# Patient Record
Sex: FEMALE | Race: WHITE | URBAN - METROPOLITAN AREA
[De-identification: names, ages, dates, MRNs, and addresses within clinical notes are randomized per-mention and may not be internally consistent; named-entity substitution may affect disease eponyms.]

---

## 2021-11-15 ENCOUNTER — WEB ENCOUNTER (OUTPATIENT)
Dept: URBAN - METROPOLITAN AREA CLINIC 40 | Facility: CLINIC | Age: 71
End: 2021-11-15

## 2021-11-15 ENCOUNTER — OFFICE VISIT (OUTPATIENT)
Dept: URBAN - METROPOLITAN AREA CLINIC 40 | Facility: CLINIC | Age: 71
End: 2021-11-15
Payer: MEDICARE

## 2021-11-15 VITALS
HEIGHT: 67 IN | DIASTOLIC BLOOD PRESSURE: 74 MMHG | BODY MASS INDEX: 23.7 KG/M2 | TEMPERATURE: 97.7 F | HEART RATE: 67 BPM | WEIGHT: 151 LBS | SYSTOLIC BLOOD PRESSURE: 122 MMHG

## 2021-11-15 DIAGNOSIS — Z80.0 FAMILY HISTORY OF COLON CANCER: ICD-10-CM

## 2021-11-15 DIAGNOSIS — K92.1 BLOOD IN STOOL: ICD-10-CM

## 2021-11-15 DIAGNOSIS — Z86.010 PERSONAL HISTORY OF COLON POLYPS: ICD-10-CM

## 2021-11-15 PROCEDURE — 99203 OFFICE O/P NEW LOW 30 MIN: CPT | Performed by: PHYSICIAN ASSISTANT

## 2021-11-15 NOTE — PHYSICAL EXAM CONSTITUTIONAL:
well developed, well nourished , in no acute distress , ambulating with walker , normal communication ability

## 2021-11-15 NOTE — HPI-TODAY'S VISIT:
Ms. Cabrera is a 70-year-old female who presents to the office today to schedule screening colonoscopy.  She actually had a colonoscopy with Dr. Klein back on June 23, 2011 where she was noted to have two small, sessile polyps of 2 to 3 mm in size removed from the cecum and ascending colon.  These were consistent with tubular adenomas.  She has a history of HTN, HLD, Hypothyroidism, rheumatoid arthritis, spinal stenosis, chronic kidney disease and anemia. Presents to the office today with no significant GI complaints.  Does have rare wipe bleeding of generally painless BRBPR if constipated. Attributes this to hemorrhoid. Has some GERD symptoms for which she will take medication famotidine. Good appetite and weight stable.

## 2021-12-27 ENCOUNTER — CLAIMS CREATED FROM THE CLAIM WINDOW (OUTPATIENT)
Dept: URBAN - METROPOLITAN AREA CLINIC 4 | Facility: CLINIC | Age: 71
End: 2021-12-27
Payer: MEDICARE

## 2021-12-27 ENCOUNTER — OFFICE VISIT (OUTPATIENT)
Dept: URBAN - METROPOLITAN AREA SURGERY CENTER 30 | Facility: SURGERY CENTER | Age: 71
End: 2021-12-27
Payer: MEDICARE

## 2021-12-27 DIAGNOSIS — Z86.010 ADENOMAS PERSONAL HISTORY OF COLONIC POLYPS: ICD-10-CM

## 2021-12-27 DIAGNOSIS — D12.0 BENIGN NEOPLASM OF CECUM: ICD-10-CM

## 2021-12-27 DIAGNOSIS — D12.2 BENIGN NEOPLASM OF ASCENDING COLON: ICD-10-CM

## 2021-12-27 DIAGNOSIS — D12.0 ADENOMA OF CECUM: ICD-10-CM

## 2021-12-27 DIAGNOSIS — Z80.0 BROTHER AT YOUNG AGE FAMILY HISTORY OF COLON CANCER: ICD-10-CM

## 2021-12-27 DIAGNOSIS — D12.2 ADENOMA OF ASCENDING COLON: ICD-10-CM

## 2021-12-27 PROCEDURE — 88305 TISSUE EXAM BY PATHOLOGIST: CPT | Performed by: PATHOLOGY

## 2021-12-27 PROCEDURE — G8907 PT DOC NO EVENTS ON DISCHARG: HCPCS | Performed by: INTERNAL MEDICINE

## 2021-12-27 PROCEDURE — 45385 COLONOSCOPY W/LESION REMOVAL: CPT | Performed by: INTERNAL MEDICINE

## 2022-01-11 ENCOUNTER — OFFICE VISIT (OUTPATIENT)
Dept: URBAN - METROPOLITAN AREA TELEHEALTH 2 | Facility: TELEHEALTH | Age: 72
End: 2022-01-11
Payer: MEDICARE

## 2022-01-11 DIAGNOSIS — Z86.010 PERSONAL HISTORY OF COLON POLYPS: ICD-10-CM

## 2022-01-11 DIAGNOSIS — K21.9 GASTROESOPHAGEAL REFLUX DISEASE, UNSPECIFIED WHETHER ESOPHAGITIS PRESENT: ICD-10-CM

## 2022-01-11 DIAGNOSIS — K92.1 BLOOD IN STOOL: ICD-10-CM

## 2022-01-11 DIAGNOSIS — K59.09 CHANGE IN BOWEL MOVEMENTS INTERMITTENT CONSTIPATION. URGENCY IN THE MORNING.: ICD-10-CM

## 2022-01-11 DIAGNOSIS — Z80.0 FAMILY HISTORY OF COLON CANCER: ICD-10-CM

## 2022-01-11 PROBLEM — 235595009: Status: ACTIVE | Noted: 2022-01-11

## 2022-01-11 PROBLEM — 428283002 HISTORY OF POLYP OF COLON: Status: ACTIVE | Noted: 2021-11-15

## 2022-01-11 PROBLEM — 35298007: Status: ACTIVE | Noted: 2022-01-11

## 2022-01-11 PROCEDURE — 99213 OFFICE O/P EST LOW 20 MIN: CPT | Performed by: INTERNAL MEDICINE

## 2022-01-11 NOTE — HPI-TODAY'S VISIT:
Ms. Cabrera is a 70-year-old female who presents to the office today to schedule screening colonoscopy.  She actually had a colonoscopy with Dr. Klein back on June 23, 2011 where she was noted to have two small, sessile polyps of 2 to 3 mm in size removed from the cecum and ascending colon.  These were consistent with tubular adenomas.  She has a history of HTN, HLD, Hypothyroidism, rheumatoid arthritis, spinal stenosis, chronic kidney disease and anemia. Presents to the office today with no significant GI complaints.  Does have rare wipe bleeding of generally painless BRBPR if constipated. Attributes this to hemorrhoid. Has some GERD symptoms for which she will take medication famotidine. Good appetite and weight stable. Patient underwent surveillance colonoscopy on 12/27/2021 for her family history of colon cancer, and personal history of colon polyps.  A total of 5 polyps were removed from the cecum and ascending colon.  The pathology of all these polyps were tubular adenoma. Patient returns for follow-up on 1/11/2022.  Overall she has done well since her colonoscopy, and I reviewed in detail the findings with her.  Because we removed 5 precancerous polyps, we will plan repeat colonoscopy within 2 years.  She does complain of mild constipation, which is help with prune juice.  I advised her to add generic MiraLAX if needed to her regimen.  She has been taking famotidine on a 3 times daily basis for reflux.  She denies any swallowing issues, she denies any abdominal pain.  She does eat well with a good appetite.

## 2022-03-21 NOTE — PHYSICAL EXAM HENT:
Head , normocephalic , atraumatic , Face , Face within normal limits , Ears , External ears within normal limits , Nose/Nasopharynx , External nose  normal appearance , Mouth and Throat , Oral cavity appearance normal
Skin normal color for race, warm, dry and intact. No evidence of rash.

## 2023-07-18 ENCOUNTER — OFFICE VISIT (OUTPATIENT)
Dept: URBAN - METROPOLITAN AREA CLINIC 40 | Facility: CLINIC | Age: 73
End: 2023-07-18

## 2023-08-10 ENCOUNTER — LAB OUTSIDE AN ENCOUNTER (OUTPATIENT)
Dept: URBAN - METROPOLITAN AREA CLINIC 40 | Facility: CLINIC | Age: 73
End: 2023-08-10

## 2023-08-10 ENCOUNTER — OFFICE VISIT (OUTPATIENT)
Dept: URBAN - METROPOLITAN AREA CLINIC 40 | Facility: CLINIC | Age: 73
End: 2023-08-10
Payer: MEDICARE

## 2023-08-10 VITALS
HEIGHT: 67 IN | HEART RATE: 83 BPM | TEMPERATURE: 97 F | SYSTOLIC BLOOD PRESSURE: 130 MMHG | WEIGHT: 140 LBS | BODY MASS INDEX: 21.97 KG/M2 | DIASTOLIC BLOOD PRESSURE: 88 MMHG

## 2023-08-10 DIAGNOSIS — D50.8 ACHLORHYDRIC ANEMIA: ICD-10-CM

## 2023-08-10 DIAGNOSIS — D36.9 ADENOMA: ICD-10-CM

## 2023-08-10 DIAGNOSIS — K21.9 GERD: ICD-10-CM

## 2023-08-10 PROCEDURE — 99214 OFFICE O/P EST MOD 30 MIN: CPT | Performed by: PHYSICIAN ASSISTANT

## 2023-08-10 RX ORDER — HYDROCODONE BITARTRATE AND ACETAMINOPHEN 5; 325 MG/1; MG/1
1 TABLET AS NEEDED TABLET ORAL
Status: ACTIVE | COMMUNITY

## 2023-08-10 RX ORDER — SIMVASTATIN 40 MG
1 TABLET IN THE EVENING TABLET ORAL ONCE A DAY
Status: ACTIVE | COMMUNITY

## 2023-08-10 RX ORDER — UPADACITINIB 15 MG/1
1 TABLET TABLET, EXTENDED RELEASE ORAL ONCE A DAY
Status: ACTIVE | COMMUNITY

## 2023-08-10 RX ORDER — GABAPENTIN 300 MG/1
1 CAPSULE CAPSULE ORAL ONCE A DAY
Status: ACTIVE | COMMUNITY

## 2023-08-10 RX ORDER — BENAZEPRIL HYDROCHLORIDE 20 MG/1
1 TABLET TABLET ORAL ONCE A DAY
Status: ACTIVE | COMMUNITY

## 2023-08-10 RX ORDER — PANTOPRAZOLE SODIUM 40 MG/1
1 TABLET TABLET, DELAYED RELEASE ORAL ONCE A DAY
Status: ACTIVE | COMMUNITY

## 2023-08-10 RX ORDER — FOLIC ACID 1 MG/1
1 TABLET TABLET ORAL ONCE A DAY
Status: ACTIVE | COMMUNITY

## 2023-08-10 RX ORDER — TIZANIDINE 4 MG/1
1 TABLET AS NEEDED TABLET ORAL THREE TIMES A DAY
Status: ACTIVE | COMMUNITY

## 2023-08-10 RX ORDER — HYDROXYCHLOROQUINE SULFATE 200 MG/1
AS DIRECTED TABLET, FILM COATED ORAL
Status: ACTIVE | COMMUNITY

## 2023-08-10 NOTE — HPI-TODAY'S VISIT:
Ms. Cabrera is a 72-year-old female was last seen 1/11/22 following screening colonoscopy.  She had prior colonoscopy with Dr. Klein back on June 23, 2011 where she was noted to have two small, sessile polyps of 2 to 3 mm in size removed from the cecum and ascending colon.  These were consistent with tubular adenomas.  She has a history of HTN, HLD, Hypothyroidism, rheumatoid arthritis, spinal stenosis, chronic kidney disease and anemia. Presents to the office today with no significant GI complaints.  Does have rare wipe bleeding of generally painless BRBPR if constipated. Attributes this to hemorrhoid. Has some GERD symptoms for which she will take medication famotidine. Good appetite and weight stable. Patient underwent surveillance colonoscopy on 12/27/2021 for her family history of colon cancer, and personal history of colon polyps.  A total of 5 polyps were removed from the cecum and ascending colon.  The pathology of all these polyps were tubular adenoma. Patient seen for follow-up on 1/11/2022.  Rare constipation, alleviated  with prune juice.  We advised her to add generic MiraLAX if needed to her regimen.  She has been taking famotidine daily basis for reflux.  She denies any swallowing issues, she denies any abdominal pain.  She does eat well with a good appetite. Recent labs noting mild, macrocytic anemia. Hb 11.2, recent iron infusion, not on po iron. Referred by Heme/Onc for evaluation. She has been on her vitamin B12 supplement as well as folic acid.  She denies any overt bleeding.  Normal bowel habits.  Denies abdominal pain.  No nausea, vomiting or dysphagia.  Denies use of NSAIDs.  She has been taking pantoprazole for her GERD symptoms which seems to help.  Admits that she is trying to monitor her diet but does drink  iced coffee drinks fairly often.  Admits to high fat, oily foods and spicy foods seem to trigger her symptoms as well.  No recent EGD.

## 2023-08-10 NOTE — PHYSICAL EXAM CONSTITUTIONAL:
well developed, well nourished , in no acute distress , ambulating with walking sticks B/L,  normal communication ability

## 2023-08-28 ENCOUNTER — CLAIMS CREATED FROM THE CLAIM WINDOW (OUTPATIENT)
Dept: URBAN - METROPOLITAN AREA CLINIC 4 | Facility: CLINIC | Age: 73
End: 2023-08-28
Payer: MEDICARE

## 2023-08-28 ENCOUNTER — OFFICE VISIT (OUTPATIENT)
Dept: URBAN - METROPOLITAN AREA SURGERY CENTER 30 | Facility: SURGERY CENTER | Age: 73
End: 2023-08-28
Payer: MEDICARE

## 2023-08-28 DIAGNOSIS — K63.89 OTHER SPECIFIED DISEASES OF INTESTINE: ICD-10-CM

## 2023-08-28 DIAGNOSIS — K63.89 APPENDICITIS EPIPLOICA: ICD-10-CM

## 2023-08-28 DIAGNOSIS — K31.89 OTHER DISEASES OF STOMACH AND DUODENUM: ICD-10-CM

## 2023-08-28 DIAGNOSIS — C18.9 ADENOCARCINOMA OF COLON: ICD-10-CM

## 2023-08-28 DIAGNOSIS — D50.9 ANEMIA: ICD-10-CM

## 2023-08-28 DIAGNOSIS — C18.7 ADENOCARCINOMA OF SIGMOID COLON: ICD-10-CM

## 2023-08-28 DIAGNOSIS — K31.7 BENIGN GASTRIC POLYP: ICD-10-CM

## 2023-08-28 PROCEDURE — 45381 COLONOSCOPY SUBMUCOUS NJX: CPT | Performed by: INTERNAL MEDICINE

## 2023-08-28 PROCEDURE — 88341 IMHCHEM/IMCYTCHM EA ADD ANTB: CPT | Performed by: PATHOLOGY

## 2023-08-28 PROCEDURE — 88305 TISSUE EXAM BY PATHOLOGIST: CPT | Performed by: PATHOLOGY

## 2023-08-28 PROCEDURE — 45380 COLONOSCOPY AND BIOPSY: CPT | Performed by: INTERNAL MEDICINE

## 2023-08-28 PROCEDURE — 43239 EGD BIOPSY SINGLE/MULTIPLE: CPT | Performed by: INTERNAL MEDICINE

## 2023-08-28 PROCEDURE — 88342 IMHCHEM/IMCYTCHM 1ST ANTB: CPT | Performed by: PATHOLOGY

## 2023-08-28 PROCEDURE — G8907 PT DOC NO EVENTS ON DISCHARG: HCPCS | Performed by: INTERNAL MEDICINE

## 2023-09-13 ENCOUNTER — TELEPHONE ENCOUNTER (OUTPATIENT)
Dept: URBAN - METROPOLITAN AREA CLINIC 6 | Facility: CLINIC | Age: 73
End: 2023-09-13

## 2023-09-26 ENCOUNTER — OFFICE VISIT (OUTPATIENT)
Dept: URBAN - METROPOLITAN AREA CLINIC 40 | Facility: CLINIC | Age: 73
End: 2023-09-26
Payer: MEDICARE

## 2023-09-26 VITALS
TEMPERATURE: 97.5 F | WEIGHT: 141 LBS | BODY MASS INDEX: 22.13 KG/M2 | DIASTOLIC BLOOD PRESSURE: 82 MMHG | HEART RATE: 83 BPM | SYSTOLIC BLOOD PRESSURE: 144 MMHG | HEIGHT: 67 IN

## 2023-09-26 DIAGNOSIS — D50.8 OTHER IRON DEFICIENCY ANEMIA: ICD-10-CM

## 2023-09-26 DIAGNOSIS — C18.9 COLON ADENOCARCINOMA: ICD-10-CM

## 2023-09-26 DIAGNOSIS — K21.9 GERD: ICD-10-CM

## 2023-09-26 DIAGNOSIS — K29.60 OTHER GASTRITIS WITHOUT BLEEDING: ICD-10-CM

## 2023-09-26 PROBLEM — 443961001: Status: ACTIVE | Noted: 2023-09-26

## 2023-09-26 PROBLEM — 408645001: Status: ACTIVE | Noted: 2023-09-26

## 2023-09-26 PROCEDURE — 99214 OFFICE O/P EST MOD 30 MIN: CPT | Performed by: PHYSICIAN ASSISTANT

## 2023-09-26 RX ORDER — PANTOPRAZOLE SODIUM 40 MG/1
1 TABLET TABLET, DELAYED RELEASE ORAL ONCE A DAY
Status: ACTIVE | COMMUNITY

## 2023-09-26 RX ORDER — GABAPENTIN 300 MG/1
1 CAPSULE CAPSULE ORAL ONCE A DAY
Status: ACTIVE | COMMUNITY

## 2023-09-26 RX ORDER — HYDROXYCHLOROQUINE SULFATE 200 MG/1
AS DIRECTED TABLET, FILM COATED ORAL
Status: ACTIVE | COMMUNITY

## 2023-09-26 RX ORDER — SIMVASTATIN 40 MG
1 TABLET IN THE EVENING TABLET ORAL ONCE A DAY
Status: ACTIVE | COMMUNITY

## 2023-09-26 RX ORDER — UPADACITINIB 15 MG/1
1 TABLET TABLET, EXTENDED RELEASE ORAL ONCE A DAY
Status: ACTIVE | COMMUNITY

## 2023-09-26 RX ORDER — TIZANIDINE 4 MG/1
1 TABLET AS NEEDED TABLET ORAL THREE TIMES A DAY
Status: ACTIVE | COMMUNITY

## 2023-09-26 RX ORDER — BENAZEPRIL HYDROCHLORIDE 20 MG/1
1 TABLET TABLET ORAL ONCE A DAY
Status: ACTIVE | COMMUNITY

## 2023-09-26 RX ORDER — HYDROCODONE BITARTRATE AND ACETAMINOPHEN 5; 325 MG/1; MG/1
1 TABLET AS NEEDED TABLET ORAL
Status: ACTIVE | COMMUNITY

## 2023-09-26 RX ORDER — FOLIC ACID 1 MG/1
1 TABLET TABLET ORAL ONCE A DAY
Status: ACTIVE | COMMUNITY

## 2023-09-26 NOTE — PHYSICAL EXAM CONSTITUTIONAL:
well developed, well nourished , in no acute distress , ambulating with walker,  normal communication ability

## 2023-09-26 NOTE — HPI-TODAY'S VISIT:
Ms. Cabrera is a 72-year-old female who returns to office following colonoscopy.  She had prior colonoscopy with Dr. Klein back on June 23, 2011 where she was noted to have two small, sessile polyps of 2 to 3 mm in size removed from the cecum and ascending colon.  These were consistent with tubular adenomas.  She has a history of HTN, HLD, Hypothyroidism, rheumatoid arthritis, spinal stenosis, chronic kidney disease and anemia. Presents to the office today with no significant GI complaints.  Does have rare wipe bleeding of generally painless BRBPR if constipated. Attributes this to hemorrhoid. Has some GERD symptoms for which she will take medication famotidine. Good appetite and weight stable. Patient underwent surveillance colonoscopy on 12/27/2021 for her family history of colon cancer, and personal history of colon polyps.  A total of 5 polyps were removed from the cecum and ascending colon.  The pathology of all these polyps were tubular adenoma. Patient seen for follow-up on 1/11/2022.  Rare constipation, alleviated  with prune juice.  We advised her to add generic MiraLAX if needed to her regimen.  She has been taking famotidine daily basis for reflux.  She denies any swallowing issues, she denies any abdominal pain.  She does eat well with a good appetite. Recent labs noting mild, macrocytic anemia. Hb 11.2, recent iron infusion, not on po iron. Referred by Heme/Onc for evaluation. She has been on her vitamin B12 supplement as well as folic acid.  She denies any overt bleeding.  Normal bowel habits.  Denies abdominal pain.  No nausea, vomiting or dysphagia.  Denies use of NSAIDs.  She has been taking pantoprazole for her GERD symptoms which seems to help.  Admits that she is trying to monitor her diet but does drink  iced coffee drinks fairly often.  Admits to high fat, oily foods and spicy foods seem to trigger her symptoms as well.  She returns 9/26/23 following EGD w/ bx, colonoscopy. During recent colonoscopy by Dr. Klein on August 28, 2023, a small cecal polyp of 1 mm removed.  There was an additional 50 mm polypoid lesion in the distal sigmoid colon which was biopsied and tattooed with carbon black.  The prep was poor.  She also had an EGD where she was noted to have mild gastritis and a small hiatal hernia, fundic gland polyp as confirmed with biopsies.  The small bowel biopsies which were obtained were normal.  There was no evidence of duodenitis.  No other findings to explain anemia outside of colon mass.  Per pathology, this was consistent with an invasive adenocarcinoma, moderately differentiated.  Dr. Klein did refer patient to [facility] colorectal surgery, Dr. Pierre.  Patient admits that she did not contact our office immediately after her procedure to let us know she was having bilious nausea and vomiting with severe abdominal pain.  However, when she did follow Dr. Pierre of colorectal surgery, she had a CT of the abdomen and pelvis with contrast which noted a large volume pneumoperitoneum without evidence of bowel perforation.  No area identified to explain this.  Pulmonary nodules were noted.  There is otherwise no metastatic disease in the chest abdomen or pelvis.  There was moderate colonic stool burden.  She continues to have constipation and only takes a stool softener.  Moderate hiatal hernia also seen.  Normal appendix.  Sigmoid mass that was identified was not well seen on the imaging.  Osseous changes noted.  Scoliosis noted.  She is scheduled for surgical resection of sigmoid colon lesion, left colectomy October 17, 2023.  She is also following up with [facility] oncology providers/Shirley Rodrigues NP.  No bleeding currently.  Denies nausea and vomiting since resolution.  She has no significant abdominal pain, tolerating a normal but bland diet.  Admits that she has a low fiber diet but does drink a lot of water.

## 2023-12-26 ENCOUNTER — DASHBOARD ENCOUNTERS (OUTPATIENT)
Age: 73
End: 2023-12-26

## 2023-12-27 ENCOUNTER — CLAIMS CREATED FROM THE CLAIM WINDOW (OUTPATIENT)
Dept: URBAN - METROPOLITAN AREA CLINIC 40 | Facility: CLINIC | Age: 73
End: 2023-12-27
Payer: MEDICARE

## 2023-12-27 DIAGNOSIS — K21.9 GERD: ICD-10-CM

## 2023-12-27 DIAGNOSIS — Z90.49 S/P PARTIAL COLECTOMY: ICD-10-CM

## 2023-12-27 DIAGNOSIS — C18.9 COLON ADENOCARCINOMA: ICD-10-CM

## 2023-12-27 DIAGNOSIS — D13.1 BENIGN FUNDIC GLAND POLYPS OF STOMACH: ICD-10-CM

## 2023-12-27 DIAGNOSIS — D12.6 ADENOMATOUS COLON POLYPS: ICD-10-CM

## 2023-12-27 DIAGNOSIS — Z86.010 PERSONAL HISTORY OF COLON POLYPS: ICD-10-CM

## 2023-12-27 DIAGNOSIS — K44.9 HIATAL HERNIA: ICD-10-CM

## 2023-12-27 DIAGNOSIS — K29.70 GASTRITIS: ICD-10-CM

## 2023-12-27 DIAGNOSIS — C18.7 ADENOCARCINOMA OF SIGMOID COLON: ICD-10-CM

## 2023-12-27 DIAGNOSIS — D50.9 IRON DEFICIENCY ANEMIA: ICD-10-CM

## 2023-12-27 DIAGNOSIS — Z80.0 FAMILY HISTORY OF COLON CANCER: ICD-10-CM

## 2023-12-27 PROCEDURE — 99214 OFFICE O/P EST MOD 30 MIN: CPT | Performed by: PHYSICIAN ASSISTANT

## 2023-12-27 RX ORDER — BENAZEPRIL HYDROCHLORIDE 20 MG/1
1 TABLET TABLET ORAL ONCE A DAY
Status: ACTIVE | COMMUNITY

## 2023-12-27 RX ORDER — GABAPENTIN 300 MG/1
1 CAPSULE CAPSULE ORAL ONCE A DAY
Status: ACTIVE | COMMUNITY

## 2023-12-27 RX ORDER — HYDROCODONE BITARTRATE AND ACETAMINOPHEN 5; 325 MG/1; MG/1
1 TABLET AS NEEDED TABLET ORAL
Status: ACTIVE | COMMUNITY

## 2023-12-27 RX ORDER — SIMVASTATIN 40 MG
1 TABLET IN THE EVENING TABLET ORAL ONCE A DAY
Status: ACTIVE | COMMUNITY

## 2023-12-27 RX ORDER — UPADACITINIB 15 MG/1
1 TABLET TABLET, EXTENDED RELEASE ORAL ONCE A DAY
Status: ACTIVE | COMMUNITY

## 2023-12-27 RX ORDER — HYDROXYCHLOROQUINE SULFATE 200 MG/1
AS DIRECTED TABLET, FILM COATED ORAL
Status: ACTIVE | COMMUNITY

## 2023-12-27 RX ORDER — FOLIC ACID 1 MG/1
1 TABLET TABLET ORAL ONCE A DAY
Status: ACTIVE | COMMUNITY

## 2023-12-27 RX ORDER — TIZANIDINE 4 MG/1
1 TABLET AS NEEDED TABLET ORAL THREE TIMES A DAY
Status: ACTIVE | COMMUNITY

## 2023-12-27 RX ORDER — PANTOPRAZOLE SODIUM 40 MG/1
1 TABLET TABLET, DELAYED RELEASE ORAL ONCE A DAY
Status: ACTIVE | COMMUNITY

## 2023-12-27 NOTE — PHYSICAL EXAM GASTROINTESTINAL
Abdomen , soft, nontender, nondistended , no guarding or rigidity , no masses palpable , normal bowel sounds , Liver and Spleen , no hepatomegaly present , no hepatosplenomegaly , liver nontender , spleen not palpable. Well-healed surgical scar

## 2023-12-27 NOTE — HPI-TODAY'S VISIT:
Ms. Cabrera is a 72-year-old female who returns to office for f/u, last visit 9/26/23, with history of anemia. She had prior colonoscopy with Dr. Klein back on June 23, 2011 where she was noted to have two small, sessile polyps of 2 to 3 mm in size removed from the cecum and ascending colon.  These were consistent with tubular adenomas.  She has a history of HTN, HLD, Hypothyroidism, rheumatoid arthritis, spinal stenosis, chronic kidney disease and anemia. Presents to the office today with no significant GI complaints.  Does have rare wipe bleeding of generally painless BRBPR if constipated. Attributes this to hemorrhoid. Has some GERD symptoms for which she will take medication famotidine. Good appetite and weight stable. Patient underwent surveillance colonoscopy on 12/27/2021 for her family history of colon cancer, and personal history of colon polyps.  A total of 5 polyps were removed from the cecum and ascending colon.  The pathology of all these polyps were tubular adenoma. Patient seen for follow-up on 1/11/2022.  Rare constipation, alleviated  with prune juice.  We advised her to add generic MiraLAX if needed to her regimen.  She has been taking famotidine daily basis for reflux.  She denies any swallowing issues, she denies any abdominal pain.  She does eat well with a good appetite. Recent labs noting mild, macrocytic anemia. Hb 11.2, recent iron infusion, not on po iron. Referred by Heme/Onc for evaluation. She has been on her vitamin B12 supplement as well as folic acid.  She denies any overt bleeding.  Normal bowel habits.  Denies abdominal pain.  No nausea, vomiting or dysphagia.  Denies use of NSAIDs.  She has been taking pantoprazole for her GERD symptoms which seems to help.  Admits that she is trying to monitor her diet but does drink  iced coffee drinks fairly often.  Admits to high fat, oily foods and spicy foods seem to trigger her symptoms as well.  She returns 9/26/23 following EGD w/ bx, colonoscopy. During recent colonoscopy by Dr. Klein on August 28, 2023, a small cecal polyp of 1 mm removed.  There was an additional 50 mm polypoid lesion in the distal sigmoid colon which was biopsied and tattooed with carbon black.  The prep was poor.  She also had an EGD where she was noted to have mild gastritis and a small hiatal hernia, fundic gland polyp as confirmed with biopsies.  The small bowel biopsies which were obtained were normal.  There was no evidence of duodenitis.  No other findings to explain anemia outside of colon mass.  Per pathology, this was consistent with an invasive adenocarcinoma, moderately differentiated.  Dr. Klein did refer patient to [facility] colorectal surgery, Dr. Pierre.  Patient admits that she did not contact our office immediately after her procedure to let us know she was having bilious nausea and vomiting with severe abdominal pain.  However, when she did follow Dr. Pierre of colorectal surgery, she had a CT of the abdomen and pelvis with contrast which noted a large volume pneumoperitoneum without evidence of bowel perforation.  No area identified to explain this.  Pulmonary nodules were noted.  There is otherwise no metastatic disease in the chest abdomen or pelvis.  There was moderate colonic stool burden.  She continues to have constipation and only takes a stool softener.  Moderate hiatal hernia also seen.  Normal appendix.  Sigmoid mass that was identified was not well seen on the imaging.  Osseous changes noted.  Scoliosis noted.  She is scheduled for surgical resection of sigmoid colon lesion, left colectomy October 17, 2023.  She is also following up with [facility] oncology providers/Shirley Rodrigues NP.  No bleeding currently.  Denies nausea and vomiting since resolution.  She has no significant abdominal pain, tolerating a normal but bland diet.  Admits that she has a low fiber diet but does drink a lot of water. Today, December 27, 2023, the patient returns after recent low anterior resection on October 17th, 2023 of sigmoid colon adenocarcinoma, by Dr. Pierre.  No complications following the procedure, she was subsequently seen by hematology/oncology, Dr. Tarango  on October 27, 2023 for management of colon adenocarcinoma and also iron deficiency anemia.  She was prescribed monofer and responded well to that.  Also, given T2 N0 M0, stage I disease, chemotherapy was not recommended.  CEA was normal at 5.8 and she will continue follow-up with hematology/oncology.  Disability status was granted by outside provider. Patient returns to the office today with no complaints.  She has a good appetite and her weight has been stable overall.  Denies any diarrhea, constipation or rectal bleeding.  Her incision is well-healed and admits occasional discomfort that occurs right below the incision that typically resolve spontaneously.  Not associated with passing flatus or bowel movement, possibly occurs after eating.  She has not identified any particular triggers.  No nausea, vomiting or dysphagia.  States that she is scheduled to have CT imaging per hematology next month as well as follow-up with her rheumatologist for blood work.

## 2024-06-27 ENCOUNTER — LAB OUTSIDE AN ENCOUNTER (OUTPATIENT)
Dept: URBAN - METROPOLITAN AREA CLINIC 40 | Facility: CLINIC | Age: 74
End: 2024-06-27

## 2024-06-27 ENCOUNTER — OFFICE VISIT (OUTPATIENT)
Dept: URBAN - METROPOLITAN AREA CLINIC 40 | Facility: CLINIC | Age: 74
End: 2024-06-27

## 2024-06-27 VITALS
DIASTOLIC BLOOD PRESSURE: 88 MMHG | SYSTOLIC BLOOD PRESSURE: 150 MMHG | TEMPERATURE: 97.7 F | HEART RATE: 84 BPM | WEIGHT: 138 LBS | BODY MASS INDEX: 21.66 KG/M2 | HEIGHT: 67 IN

## 2024-06-27 RX ORDER — TIZANIDINE 4 MG/1
1 TABLET AS NEEDED TABLET ORAL THREE TIMES A DAY
Status: ACTIVE | COMMUNITY

## 2024-06-27 RX ORDER — UPADACITINIB 15 MG/1
1 TABLET TABLET, EXTENDED RELEASE ORAL ONCE A DAY
Status: ACTIVE | COMMUNITY

## 2024-06-27 RX ORDER — BENAZEPRIL HYDROCHLORIDE 20 MG/1
1 TABLET TABLET ORAL ONCE A DAY
Status: ACTIVE | COMMUNITY

## 2024-06-27 RX ORDER — PANTOPRAZOLE SODIUM 40 MG/1
1 TABLET TABLET, DELAYED RELEASE ORAL ONCE A DAY
Status: ACTIVE | COMMUNITY

## 2024-06-27 RX ORDER — GABAPENTIN 300 MG/1
1 CAPSULE CAPSULE ORAL ONCE A DAY
Status: ACTIVE | COMMUNITY

## 2024-06-27 RX ORDER — HYDROXYCHLOROQUINE SULFATE 200 MG/1
AS DIRECTED TABLET, FILM COATED ORAL
Status: ACTIVE | COMMUNITY

## 2024-06-27 RX ORDER — SIMVASTATIN 40 MG
1 TABLET IN THE EVENING TABLET ORAL ONCE A DAY
Status: ACTIVE | COMMUNITY

## 2024-06-27 RX ORDER — FOLIC ACID 1 MG/1
1 TABLET TABLET ORAL ONCE A DAY
Status: ACTIVE | COMMUNITY

## 2024-06-27 RX ORDER — HYDROCODONE BITARTRATE AND ACETAMINOPHEN 5; 325 MG/1; MG/1
1 TABLET AS NEEDED TABLET ORAL
Status: ACTIVE | COMMUNITY

## 2024-06-27 NOTE — HPI-TODAY'S VISIT:
Ms. Cabrera is a 73-year-old female who returns to office for f/u, last visit 12/27/23, with history of anemia, colon cancer. She had prior colonoscopy with Dr. Klein back on June 23, 2011 where she was noted to have two small, sessile polyps of 2 to 3 mm in size removed from the cecum and ascending colon.  These were consistent with tubular adenomas.  She has a history of HTN, HLD, Hypothyroidism, rheumatoid arthritis, spinal stenosis, chronic kidney disease and anemia. Presents to the office today with no significant GI complaints. Patient underwent surveillance colonoscopy on 12/27/2021 for her family history of colon cancer, and personal history of colon polyps.  A total of 5 polyps were removed from the cecum and ascending colon.  The pathology of all these polyps were tubular adenoma. Prior labs noting mild, macrocytic anemia. Hb 11.2, s/p iron infusion, followed by Heme/Onc for evaluation. She has been on her vitamin B12 supplement as well as folic acid.  She denies any overt bleeding.  Normal bowel habits.  Denies abdominal pain.  No nausea, vomiting or dysphagia.  Denies use of NSAIDs.  She returns 9/26/23 following EGD w/ bx, colonoscopy. During recent colonoscopy by Dr. Klein on August 28, 2023, a small cecal polyp of 1 mm removed.  There was an additional 50 mm polypoid lesion in the distal sigmoid colon which was biopsied and tattooed with carbon black.  The prep was poor.  She also had an EGD where she was noted to have mild gastritis and a small hiatal hernia, fundic gland polyp as confirmed with biopsies.  The small bowel biopsies which were obtained were normal.  There was no evidence of duodenitis.  No other findings to explain anemia outside of colon mass.  Per pathology, this was consistent with an invasive adenocarcinoma, moderately differentiated.  Dr. Klein did refer patient to [facility] colorectal surgery, Dr. Pierre.When she did follow Dr. Pierre of colorectal surgery, she had a CT of the abdomen and pelvis with contrast which noted a large volume pneumoperitoneum without evidence of bowel perforation.  No area identified to explain this.  Pulmonary nodules were noted.  There is otherwise no metastatic disease in the chest abdomen or pelvis.  There was moderate colonic stool burden.  She continues to have constipation and only takes a stool softener.  Moderate hiatal hernia also seen.  Normal appendix.  Sigmoid mass that was identified was not well seen on the imaging.  Osseous changes noted.  Scoliosis noted.  S/p low anterior resection on October 17th, 2023 of sigmoid colon adenocarcinoma, by Dr. Pierre.  No complications following the procedure, she was subsequently seen by hematology/oncology, Dr. Tarango  on October 27, 2023 for management of colon adenocarcinoma and also iron deficiency anemia.  She was prescribed monofer and responded well to that.  Also, given T2 N0 M0, stage I disease, chemotherapy was not recommended.  CEA was normal at 5.8 and she will continue follow-up with hematology/oncology.  Disability status was granted by outside provider. Patient returns to the office today with no complaints.  She has a good appetite and her weight has been stable overall.  Denies any diarrhea, constipation or rectal bleeding. Patient has no complaints today.  Denies abdominal pain changes in bowel habits or rectal bleeding.

## 2024-08-26 ENCOUNTER — CLAIMS CREATED FROM THE CLAIM WINDOW (OUTPATIENT)
Dept: URBAN - METROPOLITAN AREA SURGERY CENTER 30 | Facility: SURGERY CENTER | Age: 74
End: 2024-08-26

## 2024-08-26 ENCOUNTER — CLAIMS CREATED FROM THE CLAIM WINDOW (OUTPATIENT)
Dept: URBAN - METROPOLITAN AREA CLINIC 4 | Facility: CLINIC | Age: 74
End: 2024-08-26
Payer: MEDICARE

## 2024-08-26 DIAGNOSIS — D12.2 BENIGN NEOPLASM OF ASCENDING COLON: ICD-10-CM

## 2024-08-26 DIAGNOSIS — D12.8 BENIGN NEOPLASM OF RECTUM: ICD-10-CM

## 2024-08-26 DIAGNOSIS — D12.3 BENIGN NEOPLASM OF TRANSVERSE COLON: ICD-10-CM

## 2024-08-26 PROCEDURE — 88305 TISSUE EXAM BY PATHOLOGIST: CPT | Performed by: PATHOLOGY

## 2024-09-26 ENCOUNTER — OFFICE VISIT (OUTPATIENT)
Dept: URBAN - METROPOLITAN AREA CLINIC 40 | Facility: CLINIC | Age: 74
End: 2024-09-26
Payer: MEDICARE

## 2024-09-26 VITALS
SYSTOLIC BLOOD PRESSURE: 142 MMHG | DIASTOLIC BLOOD PRESSURE: 88 MMHG | HEART RATE: 67 BPM | HEIGHT: 67 IN | TEMPERATURE: 96.3 F | WEIGHT: 137 LBS | BODY MASS INDEX: 21.5 KG/M2

## 2024-09-26 DIAGNOSIS — Z80.0 FAMILY HISTORY OF COLON CANCER: ICD-10-CM

## 2024-09-26 DIAGNOSIS — D13.1 BENIGN FUNDIC GLAND POLYPS OF STOMACH: ICD-10-CM

## 2024-09-26 DIAGNOSIS — Z85.038 PERSONAL HISTORY OF COLON CANCER: ICD-10-CM

## 2024-09-26 DIAGNOSIS — K29.70 GASTRITIS: ICD-10-CM

## 2024-09-26 DIAGNOSIS — K21.9 GERD: ICD-10-CM

## 2024-09-26 DIAGNOSIS — Z86.010 PERSONAL HISTORY OF COLON POLYPS: ICD-10-CM

## 2024-09-26 DIAGNOSIS — Z90.49 S/P PARTIAL COLECTOMY: ICD-10-CM

## 2024-09-26 DIAGNOSIS — D12.6 ADENOMATOUS COLON POLYPS: ICD-10-CM

## 2024-09-26 DIAGNOSIS — D50.9 IRON DEFICIENCY ANEMIA: ICD-10-CM

## 2024-09-26 DIAGNOSIS — K44.9 HIATAL HERNIA: ICD-10-CM

## 2024-09-26 PROCEDURE — 99212 OFFICE O/P EST SF 10 MIN: CPT | Performed by: PHYSICIAN ASSISTANT

## 2024-09-26 RX ORDER — UPADACITINIB 15 MG/1
1 TABLET TABLET, EXTENDED RELEASE ORAL ONCE A DAY
Status: ACTIVE | COMMUNITY

## 2024-09-26 RX ORDER — SIMVASTATIN 40 MG
1 TABLET IN THE EVENING TABLET ORAL ONCE A DAY
Status: ACTIVE | COMMUNITY

## 2024-09-26 RX ORDER — HYDROCODONE BITARTRATE AND ACETAMINOPHEN 5; 325 MG/1; MG/1
1 TABLET AS NEEDED TABLET ORAL
Status: ACTIVE | COMMUNITY

## 2024-09-26 RX ORDER — GABAPENTIN 300 MG/1
1 CAPSULE CAPSULE ORAL ONCE A DAY
Status: ACTIVE | COMMUNITY

## 2024-09-26 RX ORDER — TIZANIDINE 4 MG/1
1 TABLET AS NEEDED TABLET ORAL THREE TIMES A DAY
Status: ACTIVE | COMMUNITY

## 2024-09-26 RX ORDER — FOLIC ACID 1 MG/1
1 TABLET TABLET ORAL ONCE A DAY
Status: ACTIVE | COMMUNITY

## 2024-09-26 RX ORDER — HYDROXYCHLOROQUINE SULFATE 200 MG/1
AS DIRECTED TABLET, FILM COATED ORAL
Status: ACTIVE | COMMUNITY

## 2024-09-26 RX ORDER — PANTOPRAZOLE SODIUM 40 MG/1
1 TABLET TABLET, DELAYED RELEASE ORAL ONCE A DAY
Status: ACTIVE | COMMUNITY

## 2024-09-26 RX ORDER — BENAZEPRIL HYDROCHLORIDE 20 MG/1
1 TABLET TABLET ORAL ONCE A DAY
Status: ACTIVE | COMMUNITY

## 2024-09-26 NOTE — HPI-TODAY'S VISIT:
Ms. Cabrera is a 73-year-old female who returns to office for f/u, last visit 6/27/24, with history of anemia, colon cancer. She had prior colonoscopy with Dr. Klein back on June 23, 2011 where she was noted to have two small, sessile polyps of 2 to 3 mm in size removed from the cecum and ascending colon.  These were consistent with tubular adenomas.  She has a history of HTN, HLD, Hypothyroidism, rheumatoid arthritis, spinal stenosis, chronic kidney disease and anemia. Presents to the office today with no significant GI complaints. Patient underwent surveillance colonoscopy on 12/27/2021 for her family history of colon cancer, and personal history of colon polyps.  A total of 5 polyps were removed from the cecum and ascending colon.  The pathology of all these polyps were tubular adenoma.  At the time of a colonoscopy by Dr. Klein on August 28, 2023, a small cecal polyp of 1 mm removed.  There was an additional 50 mm polypoid lesion in the distal sigmoid colon which was biopsied and tattooed with carbon black.  The prep was poor.  She also had an EGD where she was noted to have mild gastritis and a small hiatal hernia, fundic gland polyp as confirmed with biopsies.  The small bowel biopsies which were obtained were normal.  There was no evidence of duodenitis.  No other findings to explain anemia outside of colon mass.  Per pathology, this was consistent with an invasive adenocarcinoma, moderately differentiated.  Dr. Klein did refer patient to [facility] colorectal surgery, Dr. Pierre.When she did follow Dr. Pierre of colorectal surgery, she had a CT of the abdomen and pelvis with contrast which noted a large volume pneumoperitoneum without evidence of bowel perforation.  No area identified to explain this.  Pulmonary nodules were noted.  There is otherwise no metastatic disease in the chest abdomen or pelvis.  There was moderate colonic stool burden.  She continues to have constipation and only takes a stool softener.  Moderate hiatal hernia also seen.  Normal appendix.  Sigmoid mass that was identified was not well seen on the imaging.  Osseous changes noted.  Scoliosis noted.   S/p low anterior resection on October 17th, 2023 of sigmoid colon adenocarcinoma, by Dr. Pierre.  No complications following the procedure, she was subsequently seen by hematology/oncology, Dr. Tarango  on October 27, 2023 for management of colon adenocarcinoma and also iron deficiency anemia.  She was prescribed monofer and responded well to that.  Also, given T2 N0 M0, stage I disease, chemotherapy was not recommended.  CEA was normal at 5.8 and she will continue follow-up with hematology/oncology.  Disability status was granted by outside provider. Recent surveillance colonoscopy August 26, 2024 with 5 small polyps measuring 1 to 6 mm in size removed from the ascending colon, hepatic flexure, and rectum.  Prior end-to-side colocolonic anastomosis in the sigmoid colon did appear healthy and was traversed.  Per pathology, polyps consistent with tubular adenomas in the ascending colon hepatic flexure, tubulovillous adenoma in the rectum and a 1 year surveillance recommended.